# Patient Record
Sex: MALE | Race: WHITE | NOT HISPANIC OR LATINO | URBAN - METROPOLITAN AREA
[De-identification: names, ages, dates, MRNs, and addresses within clinical notes are randomized per-mention and may not be internally consistent; named-entity substitution may affect disease eponyms.]

---

## 2023-03-29 PROBLEM — J32.9 SINOBRONCHITIS: Status: ACTIVE | Noted: 2023-03-29

## 2023-03-29 PROBLEM — R07.89 CHEST WALL DISCOMFORT: Status: ACTIVE | Noted: 2023-03-29

## 2023-03-29 PROBLEM — M47.819 SPONDYLOARTHROPATHY: Status: ACTIVE | Noted: 2023-03-29

## 2023-03-29 PROBLEM — J02.9 SORE THROAT: Status: ACTIVE | Noted: 2023-03-29

## 2023-03-29 PROBLEM — E55.9 VITAMIN D DEFICIENCY: Status: ACTIVE | Noted: 2023-03-29

## 2023-03-29 PROBLEM — J45.901 ASTHMATIC BRONCHITIS WITH EXACERBATION (HHS-HCC): Status: ACTIVE | Noted: 2023-03-29

## 2023-03-29 PROBLEM — M85.80 OSTEOPENIA: Status: ACTIVE | Noted: 2023-03-29

## 2023-03-29 PROBLEM — Q75.9 DYSMORPHIC CRANIOFACIAL FEATURES: Status: ACTIVE | Noted: 2023-03-29

## 2023-03-29 PROBLEM — Q67.8 CHEST WALL ASYMMETRY: Status: ACTIVE | Noted: 2023-03-29

## 2023-03-29 PROBLEM — J06.9 URI WITH COUGH AND CONGESTION: Status: ACTIVE | Noted: 2023-03-29

## 2023-03-29 PROBLEM — R63.4 WEIGHT LOSS: Status: ACTIVE | Noted: 2023-03-29

## 2023-03-29 PROBLEM — J40 SINOBRONCHITIS: Status: ACTIVE | Noted: 2023-03-29

## 2023-03-29 PROBLEM — L40.9 PSORIASIS: Status: ACTIVE | Noted: 2023-03-29

## 2023-03-29 PROBLEM — Q38.5 HIGH ARCHED PALATE: Status: ACTIVE | Noted: 2023-03-29

## 2023-03-29 PROBLEM — R50.9 FEVER: Status: ACTIVE | Noted: 2023-03-29

## 2023-03-29 PROBLEM — R05.9 COUGH: Status: ACTIVE | Noted: 2023-03-29

## 2023-03-29 PROBLEM — E55.9 VITAMIN D DEFICIENCY DISEASE: Status: ACTIVE | Noted: 2023-03-29

## 2023-03-29 RX ORDER — ERGOCALCIFEROL 1.25 MG/1
1 CAPSULE ORAL
COMMUNITY
Start: 2016-05-14

## 2023-03-29 RX ORDER — TIZANIDINE 4 MG/1
4 TABLET ORAL NIGHTLY PRN
COMMUNITY
Start: 2018-04-17

## 2023-03-29 RX ORDER — CALCIPOTRIENE AND BETAMETHASONE DIPROPIONATE 50; .5 UG/G; MG/G
AEROSOL, FOAM TOPICAL
COMMUNITY
Start: 2016-02-18

## 2023-03-29 RX ORDER — LATANOPROST 50 UG/ML
SOLUTION/ DROPS OPHTHALMIC
COMMUNITY

## 2023-03-29 RX ORDER — FLUTICASONE PROPIONATE 50 MCG
1-2 SPRAY, SUSPENSION (ML) NASAL DAILY
COMMUNITY

## 2023-03-31 ENCOUNTER — OFFICE VISIT (OUTPATIENT)
Dept: PRIMARY CARE | Facility: CLINIC | Age: 43
End: 2023-03-31
Payer: MEDICAID

## 2023-03-31 VITALS — DIASTOLIC BLOOD PRESSURE: 71 MMHG | SYSTOLIC BLOOD PRESSURE: 120 MMHG

## 2023-03-31 DIAGNOSIS — Z00.00 HEALTH CARE MAINTENANCE: ICD-10-CM

## 2023-03-31 DIAGNOSIS — M54.2 CERVICALGIA: ICD-10-CM

## 2023-03-31 DIAGNOSIS — L72.3 INFLAMED SEBACEOUS CYST: Primary | ICD-10-CM

## 2023-03-31 PROCEDURE — 99213 OFFICE O/P EST LOW 20 MIN: CPT | Performed by: INTERNAL MEDICINE

## 2023-03-31 RX ORDER — AMOXICILLIN AND CLAVULANATE POTASSIUM 875; 125 MG/1; MG/1
875 TABLET, FILM COATED ORAL 2 TIMES DAILY
Qty: 14 TABLET | Refills: 0 | Status: SHIPPED | OUTPATIENT
Start: 2023-03-31 | End: 2023-04-07

## 2023-03-31 NOTE — PROGRESS NOTES
Subjective   Patient ID: Wilmar Rhodes is a 42 y.o. male who presents for No chief complaint on file..    HPI sick visit same day he has noticed an enlarging cyst on the back of his neck right above the nape of his neck there is been no fever no injury there no itching has not tried to manipulate it thinks it has been there greater than 1 month minimal discomfort, mainly due to size    Review of Systems    Objective   There were no vitals taken for this visit.    Physical Exam vital signs noted alert and oriented x3 NCAT large marble sized sebaceous cyst soft with a blackhead formation but very little communication with the outer skin no erythema nontender spinous process normal upper extremity strength no JVD no adenopathy chest clear to auscultation CV regular rate and rhythm S1-S2 extremities no clubbing cyanosis or edema normal distal pulses    Assessment/Plan impression sebaceous cyst other diagnoses cervicalgia    Plan discussed with risk-benefit side effects of observation versus surgical intervention he would like to opt for trying to reduce the size even somewhat with antibiotic okay for prescription Augmentin 875 mg 1 p.o. twice daily #14 refill 0 May try warm compresses less likely to be ultimately helpful continue to not manipulate the cyst may use anti-inflammatory if there is discomfort recheck if no better and for regular visit

## 2023-04-05 DIAGNOSIS — L72.3 SEBACEOUS CYST: Primary | ICD-10-CM

## 2023-05-01 ENCOUNTER — HOSPITAL ENCOUNTER (OUTPATIENT)
Dept: DATA CONVERSION | Facility: HOSPITAL | Age: 43
End: 2023-05-01
Attending: SURGERY | Admitting: SURGERY
Payer: MEDICAID

## 2023-05-01 DIAGNOSIS — Z88.2 ALLERGY STATUS TO SULFONAMIDES: ICD-10-CM

## 2023-05-01 DIAGNOSIS — L40.9 PSORIASIS, UNSPECIFIED: ICD-10-CM

## 2023-05-01 DIAGNOSIS — L72.0 EPIDERMAL CYST: ICD-10-CM

## 2023-05-01 DIAGNOSIS — J45.909 UNSPECIFIED ASTHMA, UNCOMPLICATED (HHS-HCC): ICD-10-CM

## 2023-05-19 LAB
COMPLETE PATHOLOGY REPORT: NORMAL
CONVERTED CLINICAL DIAGNOSIS-HISTORY: NORMAL
CONVERTED FINAL DIAGNOSIS: NORMAL
CONVERTED FINAL REPORT PDF LINK TO COPY AND PASTE: NORMAL
CONVERTED GROSS DESCRIPTION: NORMAL

## 2023-09-14 NOTE — H&P
History & Physical Reviewed:   I have reviewed the History and Physical dated:  18-Apr-2023   History and Physical reviewed and relevant findings noted. Patient examined to review pertinent physical  findings.: No significant changes   Home Medications Reviewed: no changes noted   Allergies Reviewed: no changes noted       ERAS (Enhanced Recovery After Surgery):  ·  ERAS Patient: no     Consent:   COVID-19 Consent:  ·  COVID-19 Risk Consent Surgeon has reviewed key risks related to the risk of tata COVID-19 and if they contract COVID-19 what the risks are.       Electronic Signatures:  Reji Scott)  (Signed 01-May-2023 10:10)   Authored: History & Physical Reviewed, ERAS, Consent,  Note Completion      Last Updated: 01-May-2023 10:10 by Reji Scott)

## 2023-10-02 NOTE — OP NOTE
PROCEDURE DETAILS    Preoperative Diagnosis:  Epidermal cyst, L72.0    Postoperative Diagnosis:  3cm epidermoid cyst neck  Surgeon: Reji Scott  Resident/Fellow/Other Assistant: RN    Procedure:  1.  Posterior Neck Cyst Excision 3cm    Anesthesia: MAC  Estimated Blood Loss: 10  Findings: epidermoid cyst 3 cm  Specimens(s) Collected: yes,           Operative Report:   Patient comes in for elective excision of an epidermoid cyst midline posterior neck.  Risks and benefits were detailed and consent was obtained    Brought to the OR.  Timeout was performed confirm patient procedure.  Antibiotics were given.  He was placed prone position.  We prepped and draped  sterilely.  Mukesh elliptical transverse lines around the lesion and then injected local anesthetic.  Then with scalpel and cautery wide en bloc excision of the cyst was performed including the overlying island of skin.  Specimen was sent off the field.   Measured 3 x 4 cm.  We irrigated the wound make sure was hemostatic.  The deeper layer was closed with interrupted 3-0 Vicryl.  Skin closed with a running 4-0 Monocryl subcuticular suture and Dermabond.  Went to the recovery room in satisfactory condition                        Attestation:   Note Completion:  Attending Attestation I performed the procedure without a resident         Electronic Signatures:  Reji Scott)  (Signed 01-May-2023 11:04)   Authored: Post-Operative Note, Chart Review, Note Completion      Last Updated: 01-May-2023 11:04 by Reji Scott)

## 2024-05-19 ENCOUNTER — HOSPITAL ENCOUNTER (OUTPATIENT)
Dept: RADIOLOGY | Facility: EXTERNAL LOCATION | Age: 44
Discharge: HOME | End: 2024-05-19
Payer: MEDICAID

## 2024-05-19 DIAGNOSIS — S67.02XA CRUSHING INJURY OF LEFT THUMB, INITIAL ENCOUNTER: ICD-10-CM

## 2024-07-20 ENCOUNTER — HOSPITAL ENCOUNTER (EMERGENCY)
Facility: HOSPITAL | Age: 44
Discharge: HOME | End: 2024-07-20
Payer: COMMERCIAL

## 2024-07-20 VITALS
HEART RATE: 75 BPM | BODY MASS INDEX: 18.55 KG/M2 | HEIGHT: 73 IN | DIASTOLIC BLOOD PRESSURE: 101 MMHG | SYSTOLIC BLOOD PRESSURE: 130 MMHG | RESPIRATION RATE: 16 BRPM | WEIGHT: 140 LBS | OXYGEN SATURATION: 100 % | TEMPERATURE: 97.9 F

## 2024-07-20 DIAGNOSIS — L60.9 FINGERNAIL PROBLEM: ICD-10-CM

## 2024-07-20 DIAGNOSIS — S69.92XD: Primary | ICD-10-CM

## 2024-07-20 DIAGNOSIS — R03.0 ELEVATED BP WITHOUT DIAGNOSIS OF HYPERTENSION: ICD-10-CM

## 2024-07-20 PROBLEM — Q38.5: Status: ACTIVE | Noted: 2024-07-20

## 2024-07-20 PROBLEM — U07.1 DISEASE DUE TO SEVERE ACUTE RESPIRATORY SYNDROME CORONAVIRUS 2 (SARS-COV-2): Status: ACTIVE | Noted: 2024-07-20

## 2024-07-20 PROBLEM — M79.89 SWELLING OF FINGER: Status: ACTIVE | Noted: 2024-07-20

## 2024-07-20 PROCEDURE — 99282 EMERGENCY DEPT VISIT SF MDM: CPT

## 2024-07-20 ASSESSMENT — COLUMBIA-SUICIDE SEVERITY RATING SCALE - C-SSRS
1. IN THE PAST MONTH, HAVE YOU WISHED YOU WERE DEAD OR WISHED YOU COULD GO TO SLEEP AND NOT WAKE UP?: NO
6. HAVE YOU EVER DONE ANYTHING, STARTED TO DO ANYTHING, OR PREPARED TO DO ANYTHING TO END YOUR LIFE?: NO
2. HAVE YOU ACTUALLY HAD ANY THOUGHTS OF KILLING YOURSELF?: NO

## 2024-07-20 ASSESSMENT — PAIN SCALES - GENERAL: PAINLEVEL_OUTOF10: 0 - NO PAIN

## 2024-07-20 ASSESSMENT — PAIN - FUNCTIONAL ASSESSMENT: PAIN_FUNCTIONAL_ASSESSMENT: 0-10

## 2024-07-20 NOTE — DISCHARGE INSTRUCTIONS
Please keep wound clean dry and covered.  Wash it with soap and water.    Follow-up with primary care provider for blood pressure recheck

## 2024-07-20 NOTE — ED PROVIDER NOTES
Chief Complaint   Patient presents with    Finger Injury     HPI:   Wilmar Rhodes is an 43 y.o. male with history of asthma and psoriasis who presents to the ED at the referral of urgent care for evaluation of fingernail.  Patient states that he went to urgent care yesterday and they told him that if he did not take his nail off it would never grow back correctly.  On May 19 he excellently closed his fingernail in drawer which caused initial injury.  Never had trephination but does have almost complete subungual hematoma which is old.  He is right-hand dominant.  He denies any pain of the hand or digit.  Denies any symptoms including no fever, chills, chest pain or shortness of breath.    Medications:  Soc HX: Works in [place]  Allergies   Allergen Reactions    Sulfa (Sulfonamide Antibiotics) Unknown    Sulfamethoxazole-Trimethoprim Unknown   :  History reviewed. No pertinent past medical history.  Past Surgical History:   Procedure Laterality Date    TONSILLECTOMY  01/27/2016    Tonsillectomy With Adenoidectomy     Family History   Problem Relation Name Age of Onset    Pectus excavatum Maternal Cousin        Physical Exam  Vitals and nursing note reviewed.   Constitutional:       General: He is not in acute distress.     Appearance: Normal appearance. He is not ill-appearing or toxic-appearing.      Comments: Tall and slender   Eyes:      Pupils: Pupils are equal, round, and reactive to light.   Cardiovascular:      Pulses: Normal pulses.   Pulmonary:      Comments: Speaking in complete sentences  Musculoskeletal:         General: Normal range of motion.      Comments: Left hand: Normal cap refill of digits aside from first digit.  2+ radial pulse.  Sensation intact.  5/5  strength.  Left first digit has nearly complete, old subungual hematoma almost the entirety of the nail is removed and not attached to underlying nail bed.  At the distal corner radial side of the nail there is a small area that is still  attached to the underlying skin.  New nail visible growing underneath.   Skin:     General: Skin is warm and dry.   Neurological:      Mental Status: He is alert.      Cranial Nerves: No cranial nerve deficit.      Sensory: No sensory deficit.     VS: As documented in the triage note and EMR flowsheet from this visit were reviewed.    External Records Reviewed: I reviewed recent and relevant outside records including: Reviewed x-ray imaging from 5/19/2024.  Normal radiograph of the left thumb.  EMR review I am unable to find express care visit from yesterday.      Medical Decision Making:   ED Course as of 07/20/24 1048   Sat Jul 20, 2024   1020 Vitals Reviewed: Afebrile. Hypertensive. Not tachycardic nor tachypneic. No hypoxia.   [KA]   1044 Patient is 43-year-old male who presents with fingernail problem.  Back in May had finger injury.  Near complete subungual hematoma.  Almost his entire nail is loose from the nail bed aside from a very small area near the nail groove and hyponychium on the distal radial aspect of nail.  I cleansed the area with Betadine and then using forceps and scissors the dead nail off.  Very small roughly 1 mm area near the distal hyponychium radial side was left intact on the nailbed.  Wrapped in Band-Aid.  Patient's new nail is growing underneath.  Did advise that it may take months before nail is normal again.  Advised that he practice caution because the small part of his old nail that remains may be more likely to get caught on something and rip/tear.  Advised him to keep the area clean and dry.  Patient's blood pressure elevated in the ED.  Recommended he follow-up with primary care provider for BP recheck and return to ER for any new or worsening symptoms. [KA]      ED Course User Index  [KA] Faiza Gallegos PA-C         Diagnoses as of 07/20/24 1048   Fingernail injury, left, subsequent encounter   Fingernail problem   Elevated BP without diagnosis of hypertension      Escalation  of Care: Appropriate for outpatient management     Counseling: Spoke with the patient and discussed today´s findings, in addition to providing specific details for the plan of care and expected course.  Patient was given the opportunity to ask questions.    Discussed return precautions and importance of follow-up.  Advised to follow-up with PCP.  Advised to return to the ED for changing or worsening symptoms, new symptoms, complaint specific precautions, and precautions listed on the discharge paperwork.  Educated on the common potential side effects of medications prescribed.    I advised the patient that the emergency evaluation and treatment provided today doesn't end their need for medical care. It is very important that they follow-up with their primary care provider or other specialist as instructed.    The plan of care was mutually agreed upon with the patient. The patient and/or family were given the opportunity to ask questions. All questions asked today in the ED were answered to the best of my ability with today's information.    I specifically advised the patient to return to the ED for changing or worsening symptoms, worrisome new symptoms, or for any complaint specific precautions listed on the discharge paperwork.    This patient was cared for in the setting of nationwide stress on resources and staffing.    This report was transcribed using voice recognition software.  Every effort was made to ensure accuracy, however, inadvertently computerized transcription errors may be present.       Faiza Gallegos PA-C  07/20/24 0256

## 2024-07-20 NOTE — ED TRIAGE NOTES
Pt presents to the ED for finger injury in may. Pt states he is up to date on vaccinations. Pt endorses that he was told to come to the ed to get it removed and it will heal.

## 2024-12-19 ENCOUNTER — TELEPHONE (OUTPATIENT)
Dept: PRIMARY CARE | Facility: CLINIC | Age: 44
End: 2024-12-19

## 2024-12-19 ENCOUNTER — OFFICE VISIT (OUTPATIENT)
Dept: URGENT CARE | Age: 44
End: 2024-12-19
Payer: COMMERCIAL

## 2024-12-19 VITALS
TEMPERATURE: 100.1 F | OXYGEN SATURATION: 97 % | HEART RATE: 114 BPM | SYSTOLIC BLOOD PRESSURE: 106 MMHG | DIASTOLIC BLOOD PRESSURE: 71 MMHG

## 2024-12-19 DIAGNOSIS — U07.1 COVID: Primary | ICD-10-CM

## 2024-12-19 DIAGNOSIS — R05.9 COUGH, UNSPECIFIED TYPE: ICD-10-CM

## 2024-12-19 LAB
POC BINAX EXPIRATION: 0
POC BINAX NOW COVID SERIAL NUMBER: 0
POC RAPID INFLUENZA A: NEGATIVE
POC RAPID INFLUENZA B: NEGATIVE
POC SARS-COV-2 AG BINAX: ABNORMAL

## 2024-12-19 PROCEDURE — 87811 SARS-COV-2 COVID19 W/OPTIC: CPT

## 2024-12-19 PROCEDURE — 99213 OFFICE O/P EST LOW 20 MIN: CPT

## 2024-12-19 PROCEDURE — 87804 INFLUENZA ASSAY W/OPTIC: CPT

## 2024-12-19 ASSESSMENT — ENCOUNTER SYMPTOMS
HEADACHES: 1
FEVER: 1
SINUS PAIN: 1
COUGH: 1
SINUS PRESSURE: 1

## 2024-12-19 NOTE — PATIENT INSTRUCTIONS
You were seen at Urgent Care today and diagnosed with COVID-19. Please treat as discussed. Monitor for red flags which we spoke about, If your symptoms change, worsen or become concerning in any way, please go to the emergency room immediately, otherwise you can followup with your PCP in 2-3 days as needed

## 2024-12-19 NOTE — PROGRESS NOTES
Subjective   Patient ID: Wilmar Rhodes is a 44 y.o. male. They present today with a chief complaint of No chief complaint on file..    History of Present Illness  Patient is a 44-year-old male with no reported past medical history presents urgent care today with a complaint of flulike symptoms.  He notes his symptoms started yesterday.  Specifically he endorses headache, chest congestion, sinus pain/pressure and fever.  He has been using over-the-counter medication without significant relief.  He denies any chest pain or shortness of breath.  No other complaints or concerns mention at this time.      History provided by:  Patient      Past Medical History  Allergies as of 12/19/2024 - Reviewed 07/20/2024   Allergen Reaction Noted    Sulfa (sulfonamide antibiotics) Unknown 03/29/2023    Sulfamethoxazole-trimethoprim Unknown 03/29/2023       (Not in a hospital admission)         No past medical history on file.    Past Surgical History:   Procedure Laterality Date    TONSILLECTOMY  01/27/2016    Tonsillectomy With Adenoidectomy            Review of Systems  Review of Systems   Constitutional:  Positive for fever.   HENT:  Positive for congestion, sinus pressure and sinus pain.    Respiratory:  Positive for cough.    Neurological:  Positive for headaches.                                  Objective    There were no vitals filed for this visit.  No LMP for male patient.    Physical Exam  Vitals and nursing note reviewed.   Constitutional:       General: He is not in acute distress.     Appearance: Normal appearance. He is not ill-appearing, toxic-appearing or diaphoretic.   HENT:      Head: Normocephalic and atraumatic.      Right Ear: Tympanic membrane, ear canal and external ear normal.      Left Ear: Tympanic membrane, ear canal and external ear normal.      Nose: Congestion present.      Mouth/Throat:      Mouth: Mucous membranes are moist.      Pharynx: Posterior oropharyngeal erythema present. No oropharyngeal  exudate.   Eyes:      Extraocular Movements: Extraocular movements intact.      Conjunctiva/sclera: Conjunctivae normal.      Pupils: Pupils are equal, round, and reactive to light.   Cardiovascular:      Rate and Rhythm: Normal rate and regular rhythm.      Pulses: Normal pulses.      Heart sounds: Normal heart sounds.   Pulmonary:      Effort: Pulmonary effort is normal. No respiratory distress.      Breath sounds: Normal breath sounds. No stridor. No wheezing, rhonchi or rales.   Chest:      Chest wall: No tenderness.   Musculoskeletal:         General: Normal range of motion.      Cervical back: Normal range of motion and neck supple.   Skin:     General: Skin is warm and dry.      Capillary Refill: Capillary refill takes less than 2 seconds.   Neurological:      General: No focal deficit present.      Mental Status: He is alert and oriented to person, place, and time.   Psychiatric:         Mood and Affect: Mood normal.         Behavior: Behavior normal.         Procedures      Assessment/Plan   Allergies, medications, history, and pertinent labs/EKGs/Imaging reviewed by PASCUAL Ruvalcaba.     Medical Decision Making    Patient is well appearing, febrile, non toxic, not hypoxic, and appropriate for outpatient treatment and management at time of evaluation. Patient presents with flulike symptoms x 1 day as described above.     Differential includes but not limited to: COVID, influenza, pneumonia, URI, other    Rapid flu is negative.  Rapid COVID is positive.  I discussed the signs with the patient.  Recommended continued use of over-the-counter medication as needed for symptom relief and close follow-up with PCP.  I did offer to provide him with Tylenol for his fever but he states he has some at home that he will take.  Red flags and ER precautions discussed.  Patient agreement this plan.  He was discharged stable condition.  All questions and concerns addressed.           Orders and Diagnoses  There are  no diagnoses linked to this encounter.    Medical Admin Record      Follow Up Instructions  No follow-ups on file.    Patient disposition: Home    Electronically signed by PASCUAL Ruvalcaba  2:22 PM